# Patient Record
Sex: MALE | Race: WHITE | Employment: OTHER | URBAN - METROPOLITAN AREA
[De-identification: names, ages, dates, MRNs, and addresses within clinical notes are randomized per-mention and may not be internally consistent; named-entity substitution may affect disease eponyms.]

---

## 2017-01-26 ENCOUNTER — TELEPHONE (OUTPATIENT)
Dept: FAMILY MEDICINE CLINIC | Facility: CLINIC | Age: 72
End: 2017-01-26

## 2017-01-26 ENCOUNTER — PATIENT OUTREACH (OUTPATIENT)
Dept: FAMILY MEDICINE CLINIC | Facility: CLINIC | Age: 72
End: 2017-01-26

## 2017-01-26 NOTE — TELEPHONE ENCOUNTER
Spouse states she had to cancel pt's appt with Dr Shamika Salguero because he is still in rehab at The [facility] pt is stressed with discharge date, assitance with getting hospital bed on discharge, medications    Said pt would like to speak w

## 2017-01-26 NOTE — PROGRESS NOTES
Called [facility]) and left a VMM for Alison Morfin at # [phone]. I am calling to coordinate care efforts, and informed her that the patient's wife called here and is requesting services we do not typically provide.  CM name and number provided for cody

## 2017-02-02 NOTE — PROGRESS NOTES
Left PADMINI for Jojo at [facility], requesting a call back, as I did not receive a call back. I was inquiring if there were any additional care coordinations needs.

## 2017-02-02 NOTE — PROGRESS NOTES
Maxim Kay wife, emailed me just now, informing me of some discharge plans.  PLAN: Will try and contact [facility], to inquire about the possibility of a visiting MD.

## 2017-02-08 NOTE — PROGRESS NOTES
Email forwarded at this time. [ Silva Karthikeyan wife.  Had emailed me asking for information regarding certain rehab equipment.]

## 2017-02-08 NOTE — PROGRESS NOTES
Message left for [facility], requesting a person to discuss rehab equipment with. CM name and number provided for further follow-up.

## 2017-02-10 ENCOUNTER — TELEPHONE (OUTPATIENT)
Dept: FAMILY MEDICINE CLINIC | Facility: CLINIC | Age: 72
End: 2017-02-10

## 2017-02-10 NOTE — TELEPHONE ENCOUNTER
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 His wife, Eliza Islas, would like you to call her. Thank you.

## 2017-02-13 RX ORDER — LISINOPRIL 5 MG/1
5 TABLET ORAL DAILY
Qty: 30 TABLET | Refills: 0 | Status: CANCELLED | OUTPATIENT
Start: 2017-02-13

## 2017-02-13 RX ORDER — ACETAMINOPHEN 325 MG/1
650 TABLET ORAL EVERY 6 HOURS PRN
COMMUNITY
End: 2017-02-15

## 2017-02-13 RX ORDER — LEVETIRACETAM 750 MG/1
750 TABLET ORAL 2 TIMES DAILY
COMMUNITY
End: 2017-02-15

## 2017-02-13 RX ORDER — CLOPIDOGREL BISULFATE 75 MG/1
75 TABLET ORAL DAILY
COMMUNITY
End: 2017-02-15

## 2017-02-13 RX ORDER — ASPIRIN 81 MG/1
TABLET, CHEWABLE ORAL DAILY
COMMUNITY
End: 2017-02-15

## 2017-02-13 RX ORDER — LEVETIRACETAM 750 MG/1
750 TABLET ORAL 2 TIMES DAILY
Qty: 60 TABLET | Refills: 0 | Status: CANCELLED | OUTPATIENT
Start: 2017-02-13

## 2017-02-13 RX ORDER — ATORVASTATIN CALCIUM 40 MG/1
40 TABLET, FILM COATED ORAL NIGHTLY
COMMUNITY
End: 2017-02-15

## 2017-02-13 RX ORDER — ATORVASTATIN CALCIUM 40 MG/1
40 TABLET, FILM COATED ORAL NIGHTLY
Qty: 30 TABLET | Refills: 0 | Status: CANCELLED | OUTPATIENT
Start: 2017-02-13

## 2017-02-13 RX ORDER — CLOPIDOGREL BISULFATE 75 MG/1
75 TABLET ORAL DAILY
Qty: 30 TABLET | Refills: 0 | Status: CANCELLED | OUTPATIENT
Start: 2017-02-13

## 2017-02-13 RX ORDER — LISINOPRIL 5 MG/1
5 TABLET ORAL DAILY
COMMUNITY
End: 2017-02-15

## 2017-02-13 RX ORDER — FLUTICASONE PROPIONATE 50 MCG
1 SPRAY, SUSPENSION (ML) NASAL 2 TIMES DAILY
COMMUNITY
End: 2017-02-15

## 2017-02-13 RX ORDER — BIOTIN 1 MG
1 TABLET ORAL DAILY
COMMUNITY
End: 2017-02-15

## 2017-02-13 NOTE — TELEPHONE ENCOUNTER
Wife of patient. Maryjane Listen, emailed me and called me, to discuss Dr. Hernandez [place] renewing medications.

## 2017-02-13 NOTE — TELEPHONE ENCOUNTER
Per wife of pt, checking updates on pt rx med,  Pt is out of med for a day already and need it asap,  Wife of pt stts that the rehab facility should have given pt meds but they did not.  Per Wife of pt, pt supposed to take 3 of his med but since pt don't ha

## 2017-02-13 NOTE — TELEPHONE ENCOUNTER
TRIAGE-  Please see my telephone encounter from 2/10/17. I spoke to Dr. Mary Lorenzana, and he has agreed to refill medications. The patient is running out. This patient is S/P stroke, and was previously not on any medications.  The wife emailed me a copy of his

## 2017-02-13 NOTE — TELEPHONE ENCOUNTER
Please advise regarding new prescriptions. None of these are on pt medication list. Please sign if agree with message below from Domi OLIVARES.  Thank you      Hypertensive Medications  Protocol Criteria:  · Appointment scheduled in the past 6 months or in th

## 2017-02-20 ENCOUNTER — TELEPHONE (OUTPATIENT)
Dept: FAMILY MEDICINE CLINIC | Facility: CLINIC | Age: 72
End: 2017-02-20

## 2017-02-20 NOTE — TELEPHONE ENCOUNTER
Spoke with [place] at [employer], she is asking if Dr. Kirti Reis will be following pt for his [facility] orders for PT & OT. [place] will fax forms if doctor approves. Please advise.

## 2017-02-21 NOTE — TELEPHONE ENCOUNTER
Informed Ashely Means Dr. Tricia Hussein will be [facility]. Ashely Cruz will fax form for doctor.

## 2017-02-27 ENCOUNTER — TELEPHONE (OUTPATIENT)
Dept: FAMILY MEDICINE CLINIC | Facility: CLINIC | Age: 72
End: 2017-02-27

## 2017-02-27 NOTE — TELEPHONE ENCOUNTER
[address], physical therapist with [facility], called in stating she did a reassessment on pt today and is asking to extend pt's PT to twice a week, for the next four weeks taking effect March 5th.

## 2017-02-27 NOTE — TELEPHONE ENCOUNTER
Informed Amanda of [facility] [address] doctor approved extending PT, Luz Marina Sinclair had no further questions at this time.

## 2017-03-06 ENCOUNTER — TELEPHONE (OUTPATIENT)
Dept: FAMILY MEDICINE CLINIC | Facility: CLINIC | Age: 72
End: 2017-03-06

## 2017-03-06 NOTE — TELEPHONE ENCOUNTER
Forms received from [address] [place] [address]. Plan of Care signed and faxed back. No further action needed.

## 2017-04-05 ENCOUNTER — TELEPHONE (OUTPATIENT)
Dept: FAMILY MEDICINE CLINIC | Facility: CLINIC | Age: 72
End: 2017-04-05

## 2017-04-05 NOTE — TELEPHONE ENCOUNTER
Called back to pt's wife's call. She said they would like an appt sooner than the one given to them of 4/18/17 if possible. I reviewed Dr. Tory Soliz appts and didn't see anything.  I asked her what was occuring and she said that they finally got a stair li

## 2017-04-06 NOTE — TELEPHONE ENCOUNTER
Dr. Margit Goodpasture, When I went in to book the appt you approved for the sick/same day spot on Saturday, someone had already taken the spot for another pt. There is only your lunch hr listed still as green. Can you advise me as to where to put the pt?  The wife

## 2017-04-06 NOTE — TELEPHONE ENCOUNTER
Dr. Divya Roger ok'd being seen on Saturday April 8th at 12:00pm. Informed the pt's wife. She stated understanding and thanked us for our assistance.

## 2017-04-08 ENCOUNTER — OFFICE VISIT (OUTPATIENT)
Dept: FAMILY MEDICINE CLINIC | Facility: CLINIC | Age: 72
End: 2017-04-08

## 2017-04-08 ENCOUNTER — HOSPITAL ENCOUNTER (OUTPATIENT)
Dept: GENERAL RADIOLOGY | Age: 72
Discharge: HOME | End: 2017-04-08
Attending: FAMILY MEDICINE | Admitting: FAMILY MEDICINE
Payer: MEDICARE

## 2017-04-08 ENCOUNTER — TELEPHONE (OUTPATIENT)
Dept: FAMILY MEDICINE CLINIC | Facility: CLINIC | Age: 72
End: 2017-04-08

## 2017-04-08 VITALS
HEART RATE: 81 BPM | TEMPERATURE: 98 F | DIASTOLIC BLOOD PRESSURE: 70 MMHG | RESPIRATION RATE: 16 BRPM | BODY MASS INDEX: 25.58 KG/M2 | HEIGHT: 67.99 IN | WEIGHT: 168.81 LBS | SYSTOLIC BLOOD PRESSURE: 105 MMHG

## 2017-04-08 DIAGNOSIS — G89.29 CHRONIC PAIN OF LEFT WRIST: Primary | ICD-10-CM

## 2017-04-08 DIAGNOSIS — G89.29 CHRONIC PAIN OF LEFT WRIST: ICD-10-CM

## 2017-04-08 DIAGNOSIS — I63.111: ICD-10-CM

## 2017-04-08 DIAGNOSIS — M25.532 CHRONIC PAIN OF LEFT WRIST: Primary | ICD-10-CM

## 2017-04-08 DIAGNOSIS — M25.532 CHRONIC PAIN OF LEFT WRIST: ICD-10-CM

## 2017-04-08 DIAGNOSIS — I10 ESSENTIAL HYPERTENSION: ICD-10-CM

## 2017-04-08 PROCEDURE — G0463 HOSPITAL OUTPT CLINIC VISIT: HCPCS | Performed by: FAMILY MEDICINE

## 2017-04-08 PROCEDURE — 99214 OFFICE O/P EST MOD 30 MIN: CPT | Performed by: FAMILY MEDICINE

## 2017-04-08 PROCEDURE — 73110 X-RAY EXAM OF WRIST: CPT

## 2017-04-08 NOTE — TELEPHONE ENCOUNTER
Dr. Seamus Tobias saw Issac Anne on Saturday 4/8/17. Abhijitfaith Parekh left a Parking Placard form to be completed, she forgot to give it to Dr. Seamus Tobias during the appt. When the form is complete, pls mail to the , the form is in Dr. Janny Orosco.

## 2017-04-12 RX ORDER — LORATADINE 10 MG/1
10 TABLET ORAL NIGHTLY
Qty: 30 TABLET | Refills: 1 | Status: SHIPPED | OUTPATIENT
Start: 2017-04-12 | End: 2017-05-16

## 2017-04-12 NOTE — TELEPHONE ENCOUNTER
Wife and pt aware of x ray results and Dr. Earlene Evans note on them. They will call to Dr. Tammy Munoz to schedule appt.

## 2017-04-12 NOTE — TELEPHONE ENCOUNTER
Returned wife's outreach call to me. She said she had a couple of questions and actually were wondering if they could see the MD again as well. (1) She said she hadn't heard about his hand x ray and pt is still having a fair amt of pain.  I read her Dr. Karna Riedel emptying and sometimes he can't urinate unless this bladder is full and then it signals the brain. I read that often times there is only medication or surgery for this.  We wanted to bring this all up at the appt because Jd Holland is having loss of sleep, but Jd Holland

## 2017-04-13 ENCOUNTER — TELEPHONE (OUTPATIENT)
Dept: FAMILY MEDICINE CLINIC | Facility: CLINIC | Age: 72
End: 2017-04-13

## 2017-04-13 NOTE — TELEPHONE ENCOUNTER
----- Message from Franci Syed DO sent at 4/11/2017 10:14 PM CDT -----  Normal left wrist; no fracture. Recommend ortho/hand specialist. Referral on chart. Call patient.

## 2017-04-13 NOTE — TELEPHONE ENCOUNTER
Called wife. Let her know script was sent in for Claritin to their pharmacy by Dr. Ramila Trinidad and also he ok'd their need for a f/u appt on Monday April 17th at 6:40pm. Appt booked. She stated understanding and gratitude for the assistance.

## 2017-04-17 ENCOUNTER — OFFICE VISIT (OUTPATIENT)
Dept: FAMILY MEDICINE CLINIC | Facility: CLINIC | Age: 72
End: 2017-04-17

## 2017-04-17 ENCOUNTER — TELEPHONE (OUTPATIENT)
Dept: FAMILY MEDICINE CLINIC | Facility: CLINIC | Age: 72
End: 2017-04-17

## 2017-04-17 VITALS
RESPIRATION RATE: 18 BRPM | TEMPERATURE: 98 F | DIASTOLIC BLOOD PRESSURE: 74 MMHG | HEIGHT: 67.99 IN | SYSTOLIC BLOOD PRESSURE: 113 MMHG | HEART RATE: 80 BPM

## 2017-04-17 DIAGNOSIS — S40.022A: ICD-10-CM

## 2017-04-17 DIAGNOSIS — R20.9 ALTERATION OF SENSATIONS, POST-STROKE: Primary | ICD-10-CM

## 2017-04-17 DIAGNOSIS — R39.89 URINARY PROBLEM: ICD-10-CM

## 2017-04-17 DIAGNOSIS — I69.398 ALTERATION OF SENSATIONS, POST-STROKE: Primary | ICD-10-CM

## 2017-04-17 DIAGNOSIS — S40.012A: ICD-10-CM

## 2017-04-17 DIAGNOSIS — N40.1 BPH WITH OBSTRUCTION/LOWER URINARY TRACT SYMPTOMS: ICD-10-CM

## 2017-04-17 DIAGNOSIS — N13.8 BPH WITH OBSTRUCTION/LOWER URINARY TRACT SYMPTOMS: ICD-10-CM

## 2017-04-17 PROBLEM — N40.0 BPH WITHOUT OBSTRUCTION/LOWER URINARY TRACT SYMPTOMS: Status: ACTIVE | Noted: 2017-04-17

## 2017-04-17 PROCEDURE — 99214 OFFICE O/P EST MOD 30 MIN: CPT | Performed by: FAMILY MEDICINE

## 2017-04-17 PROCEDURE — G0463 HOSPITAL OUTPT CLINIC VISIT: HCPCS | Performed by: FAMILY MEDICINE

## 2017-04-17 RX ORDER — TAMSULOSIN HYDROCHLORIDE 0.4 MG/1
0.4 CAPSULE ORAL DAILY
Qty: 30 CAPSULE | Refills: 2 | Status: SHIPPED | OUTPATIENT
Start: 2017-04-17 | End: 2017-08-09

## 2017-04-17 NOTE — TELEPHONE ENCOUNTER
Hill Vazquez from [place] [address] states she faxed over plan of care for pt to [phone] last week, and has not heard back  Hill Vazquez is asking for a call back. Please advise.

## 2017-04-17 NOTE — TELEPHONE ENCOUNTER
Radha Gold returned call, states paperwork was faxed to office on 4/14/17, asking if doctor received it. Please advise.

## 2017-04-18 NOTE — PATIENT INSTRUCTIONS
Start flomax 0.4 mg orally daily. Consider switch to xyzal but for now continue with loratadine. Call [facility]). Consider neurology and urology appointments. May need a trial of gabapentin or lyrica. Observation left upper extremity.  Consider xray

## 2017-04-18 NOTE — PROGRESS NOTES
HPI:    Patient ID: Bassem Kahn is a 73year old male. Urinary  This is a chronic problem. The current episode started more than 1 month ago. The problem occurs constantly. The problem has been unchanged.  Associated symptoms include urinary symptoms Cholecalciferol (VITAMIN D3) 1000 units Oral Cap Take 1 tablet by mouth daily. Disp: 30 capsule Rfl: 5   Clopidogrel Bisulfate 75 MG Oral Tab Take 1 tablet (75 mg total) by mouth daily.  Disp: 30 tablet Rfl: 2   Fluticasone Propionate 50 MCG/ACT Nasal Suspe Start flomax 0.4 mg orally daily. Consider switch to xyzal but for now continue with loratadine. Call [facility]). Consider neurology and urology appointments. May need a trial of gabapentin or lyrica. Observation left upper extremity.  Consider xray

## 2017-04-18 NOTE — TELEPHONE ENCOUNTER
Reason for call changed to \"forms completion. \"  Please check if this paperwork was received and f/u with its completion.

## 2017-04-21 PROBLEM — I63.9 STROKE (CEREBRUM) (HCC): Status: ACTIVE | Noted: 2017-04-21

## 2017-04-22 NOTE — PATIENT INSTRUCTIONS
Compliance with medication. Continue with taught therapy at home. Pain management via prescription medications as needed.

## 2017-04-22 NOTE — PROGRESS NOTES
HPI:    Patient ID: Naveed Meeks is a 73year old male. Pain  This is a chronic problem. The current episode started more than 1 month ago. The problem occurs constantly. The problem has been unchanged.  Pertinent negatives include no chest pain or h Suspension 1 spray by Each Nare route 2 (two) times daily. Disp: 1 Inhaler Rfl: 2   levetiracetam 750 MG Oral Tab Take 1 tablet (750 mg total) by mouth 2 (two) times daily.  Disp: 60 tablet Rfl: 2   lisinopril 5 MG Oral Tab Take 1 tablet (5 mg total) by romel management via prescription medications as needed. Return in about 1 month (around 5/8/2017), or if symptoms worsen or fail to improve.          #[id number]

## 2017-04-27 ENCOUNTER — TELEPHONE (OUTPATIENT)
Dept: FAMILY MEDICINE CLINIC | Facility: CLINIC | Age: 72
End: 2017-04-27

## 2017-04-27 DIAGNOSIS — I69.354 HEMIPARESIS AFFECTING LEFT SIDE AS LATE EFFECT OF STROKE (HCC): ICD-10-CM

## 2017-04-27 DIAGNOSIS — I63.111 CEREBROVASCULAR ACCIDENT (CVA) DUE TO EMBOLISM OF RIGHT VERTEBRAL ARTERY (HCC): ICD-10-CM

## 2017-04-27 DIAGNOSIS — N40.1 BENIGN PROSTATIC HYPERPLASIA WITH LOWER URINARY TRACT SYMPTOMS, UNSPECIFIED MORPHOLOGY: Primary | ICD-10-CM

## 2017-04-27 NOTE — TELEPHONE ENCOUNTER
Spouse states pt had some concerns. Dr. Abena Meraz was going to find out about getting pt enrolled in the day program through [facility] or if doctor wants spouse to look into this.  Spouse states pt is deteriorating psychologically as he is not able to do things he

## 2017-04-28 NOTE — TELEPHONE ENCOUNTER
Neurology of ortho referral would be good for confirming a diagnosis related to his left wrist. Even if she chooses to get alternative treatment it would be good to have the diagnosis confirmed first. I have not reached out to [facility] yet but hopefully will to

## 2017-04-28 NOTE — TELEPHONE ENCOUNTER
Reviewed doctor's recommendations with pt's spouse. She will have pt keep appt with Dr. Kerrie Levine as scheduled.  Spouse states pt will not agree with seeing a psychiatrist due to his past experience with meds prescribed by a psychiatrist. Spouse would like u

## 2017-05-01 NOTE — TELEPHONE ENCOUNTER
Pt's spouse states [facility] has not received a referral for day program for pt. Nikita Sun has talked with the manager of the day program, her name is Angie Oh and she can be contacted at [phone].  Spouse states the referral and all doctor's notes regarding pt's

## 2017-05-02 ENCOUNTER — OFFICE VISIT (OUTPATIENT)
Dept: ORTHOPEDICS CLINIC | Facility: CLINIC | Age: 72
End: 2017-05-02

## 2017-05-02 DIAGNOSIS — M25.612 STIFF SHOULDER, LEFT: Primary | ICD-10-CM

## 2017-05-02 DIAGNOSIS — M25.632 STIFF WRIST, LEFT: ICD-10-CM

## 2017-05-02 DIAGNOSIS — M25.642 STIFFNESS OF HAND JOINT, LEFT: ICD-10-CM

## 2017-05-02 DIAGNOSIS — I63.9 CEREBROVASCULAR ACCIDENT (CVA), UNSPECIFIED MECHANISM (HCC): ICD-10-CM

## 2017-05-02 PROBLEM — M25.639 STIFF WRIST: Status: ACTIVE | Noted: 2017-05-02

## 2017-05-02 PROBLEM — M25.649 STIFFNESS OF HAND JOINT: Status: ACTIVE | Noted: 2017-05-02

## 2017-05-02 PROBLEM — M25.619: Status: ACTIVE | Noted: 2017-05-02

## 2017-05-02 PROCEDURE — 99214 OFFICE O/P EST MOD 30 MIN: CPT | Performed by: ORTHOPAEDIC SURGERY

## 2017-05-02 PROCEDURE — G0463 HOSPITAL OUTPT CLINIC VISIT: HCPCS | Performed by: ORTHOPAEDIC SURGERY

## 2017-05-02 RX ORDER — GABAPENTIN 100 MG/1
100 CAPSULE ORAL NIGHTLY
Qty: 90 CAPSULE | Refills: 0 | Status: SHIPPED | OUTPATIENT
Start: 2017-05-02 | End: 2017-07-21

## 2017-05-02 NOTE — TELEPHONE ENCOUNTER
Generate referral and I will sign off and we can send the notes that I have. This branch would do well to contact [facility] where the patient has been receiving his care since the stroke occurred.

## 2017-05-03 ENCOUNTER — TELEPHONE (OUTPATIENT)
Dept: FAMILY MEDICINE CLINIC | Facility: CLINIC | Age: 72
End: 2017-05-03

## 2017-05-04 NOTE — PROGRESS NOTES
5/2/2017  Radha Band  [birth year]  73year old   male  Job MD [place]    HPI:   Patient presents with:  Consult: Pt had a stoke on November 20th. with left side effected. Had rehab at [place] [address] and then [facility]   Now at home with home Tab Take 1 tablet (750 mg total) by mouth 2 (two) times daily. Disp: 60 tablet Rfl: 2   lisinopril 5 MG Oral Tab Take 1 tablet (5 mg total) by mouth daily.  Disp: 30 tablet Rfl: 2      HISTORY:  Past Medical History   Diagnosis Date   • History of depressio rotation, and internal rotation to T8 on the opposite side. He has full elbow motion.   The patient's wrist range of motion if from 20 degrees of dorsiflexion to 20 volar flexion with supination of 80 degrees and pronation of 90 degrees compared to  60 deg

## 2017-05-08 ENCOUNTER — TELEPHONE (OUTPATIENT)
Dept: FAMILY MEDICINE CLINIC | Facility: CLINIC | Age: 72
End: 2017-05-08

## 2017-05-08 DIAGNOSIS — I63.111 CEREBROVASCULAR ACCIDENT (CVA) DUE TO EMBOLISM OF RIGHT VERTEBRAL ARTERY (HCC): Primary | ICD-10-CM

## 2017-05-08 DIAGNOSIS — I69.354 HEMIPARESIS AFFECTING LEFT SIDE AS LATE EFFECT OF CEREBROVASCULAR ACCIDENT (HCC): ICD-10-CM

## 2017-05-08 NOTE — TELEPHONE ENCOUNTER
Wife stts their is an order that need to be rewritten for Outpatient  Spouse stts the Medical Order necessity, the type of service should be listed as PT and OT   Spouse stts it has to say The day Rehab program (which are 3 hour sessions)   Spouse stts thi

## 2017-05-09 ENCOUNTER — TELEPHONE (OUTPATIENT)
Dept: FAMILY MEDICINE CLINIC | Facility: CLINIC | Age: 72
End: 2017-05-09

## 2017-05-09 DIAGNOSIS — M79.642 LEFT HAND PAIN: Primary | ICD-10-CM

## 2017-05-09 NOTE — TELEPHONE ENCOUNTER
Spouse calling regarding needing a referral for an orthopedic that is close to pt home.   Please advise

## 2017-05-15 ENCOUNTER — TELEPHONE (OUTPATIENT)
Dept: FAMILY MEDICINE CLINIC | Facility: CLINIC | Age: 72
End: 2017-05-15

## 2017-05-15 NOTE — TELEPHONE ENCOUNTER
I'm not sure if his diagnoses qualify for this therapy, however I suggest the patient visit a marijuana clinic and they will provide him with a list to get to me as to what the state deems treatable via marijuana.

## 2017-05-15 NOTE — TELEPHONE ENCOUNTER
Pt's wife asking to speak with a nurse. Spouse stts pt's anxiety and stress is getting worse. please advise.

## 2017-05-15 NOTE — TELEPHONE ENCOUNTER
Call transferred to RN from [address] [place] [address]. Pt's spouse is asking  whether or not Dr. Chicho Craven would be open to discussing prescribing medical cannabis for pt's extreme anxiety and stress related to stroke.  Pt does not want to take meds for anxiety as he had prob

## 2017-05-15 NOTE — TELEPHONE ENCOUNTER
Pt's spouse returned call. States she went to the "[employer] and obtained list of covered dx from that website. Spouse states PTSD is a covered diagnosis. Spouse asked to schedule appt to discuss this with Dr. Sheldon Gil. Please advise.

## 2017-05-16 ENCOUNTER — TELEPHONE (OUTPATIENT)
Dept: FAMILY MEDICINE CLINIC | Facility: CLINIC | Age: 72
End: 2017-05-16

## 2017-05-16 RX ORDER — LORATADINE 10 MG/1
10 TABLET ORAL NIGHTLY
Qty: 30 TABLET | Refills: 2 | Status: SHIPPED | OUTPATIENT
Start: 2017-05-16 | End: 2017-07-21

## 2017-05-16 RX ORDER — CLOPIDOGREL BISULFATE 75 MG/1
75 TABLET ORAL DAILY
Qty: 30 TABLET | Refills: 2 | Status: SHIPPED | OUTPATIENT
Start: 2017-05-16 | End: 2017-07-21

## 2017-05-16 NOTE — TELEPHONE ENCOUNTER
Spouse states she received a call from pharmacist informing her some prescriptions were ready for . She is asking if pt's Loratadine and Clopidogrel can also be refilled, she would like to  all meds at one time. Meds pended for review.

## 2017-05-16 NOTE — TELEPHONE ENCOUNTER
She has question on which medication will he be taking. Because he has no refills on some of them  Then the next question was that she want a referral to the neuro doctor that is in [facility], or [place], [place], [address] [place].   SHe does not want to come

## 2017-05-17 NOTE — TELEPHONE ENCOUNTER
Pt's wife is calling again stating that the order needs to be rewritten. She would like to speak with nurse Yahir Rider please.  Thank you

## 2017-05-17 NOTE — TELEPHONE ENCOUNTER
Patient wife calling states:  the the order has to say occupational Therapy - px therapy, and speech therapy if doctor can.    Also has to be signed by doctor and be an order not referral.

## 2017-05-18 NOTE — TELEPHONE ENCOUNTER
Pt has Medicare and [employer] secondary. She does not need referrals. FR would you know of an ortho in [facility]?

## 2017-05-18 NOTE — TELEPHONE ENCOUNTER
You can send my office notes and you can write the requested referrals and I will sign off. Again, just in case no one saw what I wrote the first time, this patient was treated @ [facility]...  If they really want the complete records and scenario t

## 2017-05-19 NOTE — TELEPHONE ENCOUNTER
Current Outpatient Prescriptions:  levetiracetam 750 MG Oral Tab Take 1 tablet (750 mg total) by mouth 2 (two) times daily.  Disp: 60 tablet Rfl: ROME LEI REFILL

## 2017-05-19 NOTE — TELEPHONE ENCOUNTER
Requesting Levetiracetam refill    Last filled 2/15/17  Last OV 4/17/17; appt scheduled 6/13/17    Med pended for review, please advise

## 2017-05-20 RX ORDER — LEVETIRACETAM 750 MG/1
750 TABLET ORAL 2 TIMES DAILY
Qty: 60 TABLET | Refills: 2 | Status: SHIPPED | OUTPATIENT
Start: 2017-05-20 | End: 2017-07-21

## 2017-05-26 NOTE — TELEPHONE ENCOUNTER
Call transferred to RN from [address] [place] [address]. Pt's spouse state she needs an order,not a referral, for the [address] [place] [address] Avenue in [facility].  Spouse states this needs to, have at least two therapies listed preferably three-physi

## 2017-06-07 ENCOUNTER — TELEPHONE (OUTPATIENT)
Dept: FAMILY MEDICINE CLINIC | Facility: CLINIC | Age: 72
End: 2017-06-07

## 2017-06-07 NOTE — TELEPHONE ENCOUNTER
Wife, Will Brink called in for patient. She said she would like to speak to [facility] or Dr Dana Mckenzie re: patient medication's. She is taking him off two of the medications. That is all she would tell me.

## 2017-06-07 NOTE — TELEPHONE ENCOUNTER
Spouse states after today she is not going to give pt Levetiracetam and Atorvastatin as pt thinks he is having side effects from these meds, just not feeling well, can't sleep and having aches and pains.  Pt had a stroke on 11/20/16 and was placed on these

## 2017-06-07 NOTE — TELEPHONE ENCOUNTER
I don't agree with the plan to stop cholesterol lowering medication, especially after the recent history of stroke. I do think he should make an appointment to discuss this with [facility].   Thanks

## 2017-06-07 NOTE — TELEPHONE ENCOUNTER
Pt informed of Dr Prudence Tatum orders below. Offered appt with Dr Mary Ruelas to discuss his issue further, but will have his caretaker call back to schedule his appt.

## 2017-06-08 NOTE — TELEPHONE ENCOUNTER
Pt's spouse called back, spouse will continue to give pt his Atorvastatin as prescribed. Pt has an appt with Dr. Howell at [facility] on 6/19/17 and will have his medications reviewed at that time as Dr. Howell prescribed meds when pt was at [facility].  Spouse also stat

## 2017-07-05 ENCOUNTER — TELEPHONE (OUTPATIENT)
Dept: FAMILY MEDICINE CLINIC | Facility: CLINIC | Age: 72
End: 2017-07-05

## 2017-07-05 DIAGNOSIS — K59.00 DIFFICULT BOWEL MOVEMENTS: Primary | ICD-10-CM

## 2017-07-05 NOTE — TELEPHONE ENCOUNTER
LMTCB to relay MD message below and provide GI contact information. Please transfer [id number] anytime. Thank you.       Referred to  [address] [place] [address], [address], [place] [address] [address] [phone]

## 2017-07-05 NOTE — TELEPHONE ENCOUNTER
Vasquez Jordan calling on behalf of patient. States patient is experiencing constipation.  Please call patient at [phone]

## 2017-07-05 NOTE — TELEPHONE ENCOUNTER
Actions Requested: pt states he feels something stuck in his rectum, pt had a bowel movement this morning. Pt and spouse state he always has a problem with constipation but can't take any  meds for this.  Pt declined advice to go to UC as no appt available

## 2017-07-06 NOTE — TELEPHONE ENCOUNTER
Spouse returned call, reviewed doctor's GI referral recommendations. Spouse declined to see GI and she is using alternative methods from a [facility] specialist to relieve pt's abd discomfort, she will be taking pt to see a holistic practitioner, Johan Wright.  For

## 2017-07-14 ENCOUNTER — TELEPHONE (OUTPATIENT)
Dept: SURGERY | Facility: CLINIC | Age: 72
End: 2017-07-14

## 2017-07-14 NOTE — TELEPHONE ENCOUNTER
Verbally informed  of scenario as outlined below in this encounter, and  stated he agrees with plan for ER. No further orders/advice given. Phoned wife back and received voice mail.  Lmtcb, stating Henryace Shabana agrees with advice for ER,as advis

## 2017-07-14 NOTE — TELEPHONE ENCOUNTER
See below. Returned wife's call and spoke with her. She states pt can only urinate when laying down, and this issue with not being able to urinate is is driving him insane. Ask wife how long pt has not been able to urinate.  She states this has been for [employer]

## 2017-07-14 NOTE — TELEPHONE ENCOUNTER
pts wife, Marcelina Wells called. Bradley Mullins had a stroke in the past, trouble with urination, can only urinated while laying down. Has been on Tamsulosin for a month, not helping. I made appt with [facility] on 8/7/17 because that was the soonest with all out 's.  (They

## 2017-07-21 ENCOUNTER — OFFICE VISIT (OUTPATIENT)
Dept: SURGERY | Facility: CLINIC | Age: 72
End: 2017-07-21

## 2017-07-21 ENCOUNTER — APPOINTMENT (OUTPATIENT)
Dept: LAB | Facility: HOSPITAL | Age: 72
End: 2017-07-21
Attending: UROLOGY
Payer: MEDICARE

## 2017-07-21 VITALS — SYSTOLIC BLOOD PRESSURE: 113 MMHG | DIASTOLIC BLOOD PRESSURE: 76 MMHG | HEART RATE: 79 BPM | TEMPERATURE: 98 F

## 2017-07-21 DIAGNOSIS — N19 RENAL FAILURE, UNSPECIFIED CHRONICITY: ICD-10-CM

## 2017-07-21 DIAGNOSIS — R35.0 BENIGN PROSTATIC HYPERPLASIA WITH URINARY FREQUENCY: ICD-10-CM

## 2017-07-21 DIAGNOSIS — R31.29 MICROHEMATURIA: ICD-10-CM

## 2017-07-21 DIAGNOSIS — R35.1 NOCTURIA: ICD-10-CM

## 2017-07-21 DIAGNOSIS — N31.9 NEUROGENIC BLADDER: ICD-10-CM

## 2017-07-21 DIAGNOSIS — K59.01 SLOW TRANSIT CONSTIPATION: ICD-10-CM

## 2017-07-21 DIAGNOSIS — N20.0 KIDNEY STONE: Primary | ICD-10-CM

## 2017-07-21 DIAGNOSIS — N40.1 BENIGN PROSTATIC HYPERPLASIA WITH URINARY FREQUENCY: ICD-10-CM

## 2017-07-21 LAB
ALBUMIN SERPL BCP-MCNC: 4.4 G/DL (ref 3.5–4.8)
ANION GAP SERPL CALC-SCNC: 8 MMOL/L (ref 0–18)
BACTERIA UR QL AUTO: NEGATIVE /HPF
BILIRUB UR QL: NEGATIVE
BUN SERPL-MCNC: 15 MG/DL (ref 8–20)
BUN/CREAT SERPL: 18.5 (ref 10–20)
CALCIUM SERPL-MCNC: 9.7 MG/DL (ref 8.5–10.5)
CHLORIDE SERPL-SCNC: 105 MMOL/L (ref 95–110)
CO2 SERPL-SCNC: 26 MMOL/L (ref 22–32)
COLOR UR: YELLOW
CREAT SERPL-MCNC: 0.81 MG/DL (ref 0.5–1.5)
GLUCOSE SERPL-MCNC: 94 MG/DL (ref 70–99)
GLUCOSE UR-MCNC: NEGATIVE MG/DL
HYALINE CASTS #/AREA URNS AUTO: 3 /LPF
KETONES UR-MCNC: NEGATIVE MG/DL
NITRITE UR QL STRIP.AUTO: NEGATIVE
OSMOLALITY UR CALC.SUM OF ELEC: 289 MOSM/KG (ref 275–295)
PH UR: 5 [PH] (ref 5–8)
PHOSPHATE SERPL-MCNC: 3.4 MG/DL (ref 2.4–4.7)
POTASSIUM SERPL-SCNC: 4.1 MMOL/L (ref 3.3–5.1)
PROT UR-MCNC: 30 MG/DL
RBC #/AREA URNS AUTO: 5 /HPF
SODIUM SERPL-SCNC: 139 MMOL/L (ref 136–144)
SP GR UR STRIP: 1.03 (ref 1–1.03)
UROBILINOGEN UR STRIP-ACNC: <2
VIT C UR-MCNC: NEGATIVE MG/DL
WBC #/AREA URNS AUTO: 2 /HPF

## 2017-07-21 PROCEDURE — 87086 URINE CULTURE/COLONY COUNT: CPT

## 2017-07-21 PROCEDURE — 99215 OFFICE O/P EST HI 40 MIN: CPT | Performed by: UROLOGY

## 2017-07-21 PROCEDURE — 36415 COLL VENOUS BLD VENIPUNCTURE: CPT

## 2017-07-21 PROCEDURE — G0463 HOSPITAL OUTPT CLINIC VISIT: HCPCS | Performed by: UROLOGY

## 2017-07-21 PROCEDURE — 80069 RENAL FUNCTION PANEL: CPT

## 2017-07-21 PROCEDURE — 87077 CULTURE AEROBIC IDENTIFY: CPT

## 2017-07-21 PROCEDURE — 81001 URINALYSIS AUTO W/SCOPE: CPT

## 2017-07-21 RX ORDER — FINASTERIDE 5 MG/1
5 TABLET, FILM COATED ORAL DAILY
Qty: 90 TABLET | Refills: 3 | Status: SHIPPED | OUTPATIENT
Start: 2017-07-21 | End: 2018-07-21

## 2017-07-21 RX ORDER — TAMSULOSIN HYDROCHLORIDE 0.4 MG/1
CAPSULE ORAL DAILY
COMMUNITY

## 2017-07-21 NOTE — PATIENT INSTRUCTIONS
1.  At the laboratory today blood draw for renal function (exclude possibility of kidney failure); also urinalysis urine test and urine culture    2.   For long-standing constipation, please take a tablespoon of milk of magnesia every day; if 1 tablespoon i Hyperplasia    The prostate is a small gland that makes semen. As you age, the prostate grows. If it becomes too big, it may cause problems with urination. This condition is called benign prostatic hyperplasia (BPH).   Symptoms of BPH  BPH is common in men muscle to stop or slow down the flow of urine. Hold for 10 seconds. Repeat at least 5 times. Do the exercise 3 to 5 times each day. Certain medicines can worsen BPH symptoms. These include medicines for congestion, allergies, and depression.  Medicines monica

## 2017-07-21 NOTE — PROGRESS NOTES
HPI:    Patient ID: Newton Lorenzo is a 73year old male. HPI    History provided by patient and wife, Trina Fields. 1. Kidney stone   Hx of kidney stones. No concerning sxs at present. No dysuria or gross hematuria.  Pt states he has considerably reduc [address] [facility]  consult Dr. Pino Aw 4/14/2016 for 2 distal left ureteral calculi 5 mm and 2.5 mm causing left hydroureteronephrosis and renal colic; CT showed additional 3 mm stone left kidney. Moderate to large BPH 2-3+ enlarged.  Failed to pass stone.  4/15/2016 cysto \"cataracts\" per NG   • CVA (cerebral vascular accident) [facility])     Nov 2016   • History of depression    • IBS (irritable bowel syndrome)     irritable bowel disease      Past Surgical History:  No date: APPENDECTOMY  2011: CATARACT Bilateral      Commen 1. The previously seen calculus along the distal aspect of the left ureteral stent is not clearly delineated by radiographic technique.   2. Numerous deep pelvic calcifications are favored to represent phleboliths and do not appear significantly changed in Plan: Patient reports worsening in voiding dysfunction after stroke 11/2016. He describes change as confusing urinating signals such as urgency and then lack of sensation to urinating. He reports he is able to urinate easier while laying on side or back.  Raghu Phililps 3.  Please schedule bladder ultrasound--exclude possibility of urinary retention/incomplete emptying of the bladder; please call office day after the bladder ultrasound leave message you are calling for the results and we will get back to    4.  To lower yo BPH is common in men over age 61. That’s because the prostate grows bigger during a man’s life. As it grows, it presses against the urethra. The urethra carries urine out of your body from your bladder through your penis.  Your bladder may also weaken as yo Certain medicines can worsen BPH symptoms. These include medicines for congestion, allergies, and depression. Medicines that increase your urine flow (diuretics) can also worsen BPH symptoms. If you take any of these, talk with your provider.  You may need By signing my name below, Sammy Kerr,  attest that this documentation has been prepared under the direction and in the presence of Elias Vallejo MD.   Electronically Signed: Cheryle Boning, 7/21/2017, 1:12 PM.      Abe Chowdhury, Elias Vallejo MD,  person

## 2017-07-24 ENCOUNTER — HOSPITAL ENCOUNTER (OUTPATIENT)
Dept: ULTRASOUND IMAGING | Age: 72
Discharge: HOME | End: 2017-07-24
Attending: UROLOGY
Payer: MEDICARE

## 2017-07-24 DIAGNOSIS — R35.1 NOCTURIA: ICD-10-CM

## 2017-07-24 DIAGNOSIS — N31.9 NEUROGENIC BLADDER: ICD-10-CM

## 2017-07-24 PROCEDURE — 76857 US EXAM PELVIC LIMITED: CPT | Performed by: UROLOGY

## 2017-07-25 ENCOUNTER — TELEPHONE (OUTPATIENT)
Dept: SURGERY | Facility: CLINIC | Age: 72
End: 2017-07-25

## 2017-07-25 NOTE — TELEPHONE ENCOUNTER
----- Message from Saad Garber MD sent at 7/23/2017  1:09 PM CDT -----  Please call patient.   On 7/21/17 urine culture showed a bacteria which is usually present on the skin of the outside of the body and usually does not cause urinary tract infectio

## 2017-07-26 ENCOUNTER — PATIENT MESSAGE (OUTPATIENT)
Dept: SURGERY | Facility: CLINIC | Age: 72
End: 2017-07-26

## 2017-07-26 ENCOUNTER — TELEPHONE (OUTPATIENT)
Dept: SURGERY | Facility: CLINIC | Age: 72
End: 2017-07-26

## 2017-07-26 NOTE — TELEPHONE ENCOUNTER
----- Message from Vasiliy Roberson MD sent at 7/24/2017  8:57 PM CDT -----  Please call patient and/or nursing home. Bladder ultrasound shows post void residual of 188 mL or approximately 6 ounces; patient has incomplete emptying of the bladder but does not need insertion of Pichardo catheter. Anytime patient urinates, he should wait 1-2 minutes after urination and then tried to push additional urine out of the bladder. Important to avoid constipation, which would make bladder emptying problem worse. Please continue urology plans as last discussed.   I wish patient the best of health, HARRY WrenD

## 2017-07-26 NOTE — TELEPHONE ENCOUNTER
I spoke with pt and gave results and instructions and pt and spouse were on the line and informed that pt did not void after the bladder US because he could not and tried to void for over 30 min but could not so the so called PVR is actually what was retained after drinking fluids to prepare for the [address]. Pt also admitted that he struggles with constipation and is current using MOM and also considering a less harsh laxative also. I told pt that I will inform PVK. Tasked to [address]  for his info.

## 2017-07-31 NOTE — TELEPHONE ENCOUNTER
I agree with these plans, and my recommendations are still the same as I entered on 7/24/17.   Thanks, Dr. Edgardo Aquino

## 2017-08-14 RX ORDER — TAMSULOSIN HYDROCHLORIDE 0.4 MG/1
0.4 CAPSULE ORAL DAILY
Qty: 30 CAPSULE | Refills: 2 | Status: SHIPPED | OUTPATIENT
Start: 2017-08-14 | End: 2017-09-13

## 2017-08-15 NOTE — TELEPHONE ENCOUNTER
LOV: 4/17/17 Last Rx: listed as historical     Please advise in regards to refill request. Thank You

## 2017-08-21 ENCOUNTER — TELEPHONE (OUTPATIENT)
Dept: SURGERY | Facility: CLINIC | Age: 72
End: 2017-08-21

## 2017-08-21 ENCOUNTER — NURSE TRIAGE (OUTPATIENT)
Dept: OTHER | Age: 72
End: 2017-08-21

## 2017-08-21 DIAGNOSIS — R10.9 FLANK PAIN: ICD-10-CM

## 2017-08-21 DIAGNOSIS — N20.0 KIDNEY STONE: Primary | ICD-10-CM

## 2017-08-21 NOTE — TELEPHONE ENCOUNTER
Action Requested: Summary for Provider     []  Critical Lab, Recommendations Needed  [] Need Additional Advice  []   FYI    []   Need Orders  [] Need Medications Sent to Pharmacy  [x]  Other     SUMMARY:    Patient's wife is asking what the patient can edwina

## 2017-08-21 NOTE — TELEPHONE ENCOUNTER
Pt's spouse called about a new pain pt has been experiencing in his back/side area. While on phone with spouse she received a call from the urologist office, spouse states she will call back after talking with Dr. Kait Soliman' nurse.

## 2017-08-21 NOTE — TELEPHONE ENCOUNTER
Spoke with pt and spouse and determined that pt has severe pain in the Rt. Flank area when in a sitting position and when he attempts to stand and walk. The pain subsides when he is lying down or reclining. Denies that pain radiates to any other area of the body. States that he has not taken anything for pain and is asking that PVK prescribe something. Also wants to know if he can still go to his rehab tomorrow. States he has a HX of stones and thinks it may be a stone. Denies blood in his urine, fever and chills or n/v. Moving his bowels daily without issue. I informed that typically kidney stone pain does not subside or get better with position changes. I also suggested that this pain may worse at rehab and could also possibly be related to something that the rehab exercises cause. I told them that I will send this msg to Leonor Corona of his concerns and requests and get back to them with a response. Tasked to Elena Kathleen. Please advise.

## 2017-08-21 NOTE — TELEPHONE ENCOUNTER
Please call patient back. If symptoms since from patient's description, symptoms appear severe, I recommend CT without IV contrast; I will enter order. Further recommendations will depend on that. .  Thanks,  Dr. Delano Anne

## 2017-08-21 NOTE — TELEPHONE ENCOUNTER
Spoke with pt's spouse and informed her of PVK's response msg and instructions below and told her that the order is in the system and told her to ck with the secondary ins if PA is needed and if so let us know and if not to call and schd. I gave the central schdg. # to call. Spouse also informed that she spoke with Dr. Vaishali Hernandez office and they suggested use of a heating pad and she states that it has brought his pain level down to 4-5. I told her that I would let PVK know. Tasked to Elan Hector for his info.

## 2017-08-22 NOTE — TELEPHONE ENCOUNTER
Wife feels it Pt's Posture r/t to discomfort,once his posture is corrected after 30 mins,the 10/10 pain is decreased to 5/10,rubbed Tiger balm to sore doug, she  Plans to give him ibuprofen also, will make appt if all fails

## 2017-08-22 NOTE — TELEPHONE ENCOUNTER
Patient's wife was informed of Dr. Chicho Craven recommendation with understanding. Will report back in 1 week of    Deonna Talavera DO Physician Signed  Date of Service: 08/21/2017 1:12 PM         Renal status as of 07/21/17 was very good.  600 mg orally

## 2017-08-22 NOTE — TELEPHONE ENCOUNTER
Renal status as of 07/21/17 was very good. 600 mg orally twice daily with meals x 7 days and then give a report.

## 2017-08-22 NOTE — TELEPHONE ENCOUNTER
Pt's wife called back again. Would like to know max dose of ibuprofen FJR recommends along with his other medications and his renal history. FJR please advise.

## 2017-08-22 NOTE — TELEPHONE ENCOUNTER
Akilah Hilton. Natalia Rodríguez, just wanted to verify you had seen this? The time stamp on your last note was 1:12pm, but I think that may be wrong? Please advise.  Thanks

## 2017-08-22 NOTE — TELEPHONE ENCOUNTER
Dr Sheldon Gil: spouse is asking if you can recommend a pain medication or do you recommend she increase ibuprofen?     I received an Incoming call:  Noam Bradley from spouse: Patient still c/o of lower back pain (waist area)     Patient has tried Ibuprofen 200mg ev

## 2017-08-22 NOTE — TELEPHONE ENCOUNTER
Have any measures of pain control been tried? May need a same day assessment if source has not been determined.

## 2017-08-22 NOTE — TELEPHONE ENCOUNTER
I have no idea what you all are looking for here. It seems as though the wife says she will get back to us if she needs to. What specifically do you want advise on here?

## 2017-08-23 NOTE — TELEPHONE ENCOUNTER
Spoke with pt's spouse who called stating that she spoke with Dr. Alhaji Mack office instructed that he thinks that pt's rt flank pain is muscular in nature because he is sitting in the w/c for many hrs at a time and is using the muscles on the rt. Side much more to compensate for the stroke arm on the left.

## 2017-08-28 ENCOUNTER — TELEPHONE (OUTPATIENT)
Dept: FAMILY MEDICINE CLINIC | Facility: CLINIC | Age: 72
End: 2017-08-28

## 2017-08-28 NOTE — TELEPHONE ENCOUNTER
Called pt's wife, Rain Spencer, she is inquiring about the dosage of Ibuprofen. She states that she believes the ibuprofen 600mg is relieving his pain, she is asking what the dose should be on now that it has been a week. Still doing exercises for posture. Pain is still present but slightly improved. He is still going to therapy and the pt is motivated to keep up with exercise at home now due to his pain. She is disappointed, however, because Medicare is \"kicking him out of therapy because of his lack of progress. \" I advised that she discuss this with his physical therapist. If he is now improving, it's possible that the decision can be changed. Advised her that if his pain is relieved with a lesser dose, then that it is the dose that should be used. Stressed that 600mg was the upper limit that was advised by the Dr. but that a lower dose that will make him comfortable is always recommended. Also stressed importance of taking med with food. She voices understanding and agrees with plan, she will try 400mg tomorrow. DANIELA Licea, also, please have staff address form issue below. She also states that there is a hippa form that she needs sent from the office so that she can give it to [facility]. She is trying to get a home tax exemption and needs information to be released. Please advise, she was told by TOMMY that the office has the forms. She would like one mailed.

## 2017-08-28 NOTE — TELEPHONE ENCOUNTER
Pts wife has questions about dosing for giving the pt. Ibuprofen 600mg. She states that the dosage is supposed to be tapered this week?

## 2017-08-30 NOTE — TELEPHONE ENCOUNTER
Agree with advise given. As far as any administrative forms you all would do better to direct that to the administration of this office or the [facility] or to [facility].

## 2017-09-06 ENCOUNTER — TELEPHONE (OUTPATIENT)
Dept: FAMILY MEDICINE CLINIC | Facility: CLINIC | Age: 72
End: 2017-09-06

## 2017-09-06 NOTE — TELEPHONE ENCOUNTER
Spouse states that pt is going to be discharged from [facility]. Spouse would like to know if pt should continue to take melatonin medication and would like to confirm if the dosage pt is taking is correct.  Spouse would like to speak

## 2017-09-06 NOTE — TELEPHONE ENCOUNTER
Spouse would like to know the name of the ophthalmologist that did the cataract surgery of the patient.

## 2017-09-09 ENCOUNTER — NURSE TRIAGE (OUTPATIENT)
Dept: FAMILY MEDICINE CLINIC | Facility: CLINIC | Age: 72
End: 2017-09-09

## 2017-09-09 NOTE — TELEPHONE ENCOUNTER
Action Requested: Summary for Provider     []  Critical Lab, Recommendations Needed  [] Need Additional Advice  []   FYI    []   Need Orders  [] Need Medications Sent to Pharmacy  []  Other     SUMMARY: OFFICE VISIT, blood in stool  Pt's wife calling, stat

## 2017-09-19 ENCOUNTER — TELEPHONE (OUTPATIENT)
Dept: SURGERY | Facility: CLINIC | Age: 72
End: 2017-09-19

## 2017-09-19 NOTE — TELEPHONE ENCOUNTER
Pts spouse states she needs to speak to RN, states she does not the priority of the 4 tests ordered by PVK for pt. Pls call thank you.

## 2017-09-21 ENCOUNTER — OFFICE VISIT (OUTPATIENT)
Dept: FAMILY MEDICINE CLINIC | Facility: CLINIC | Age: 72
End: 2017-09-21

## 2017-09-21 VITALS
SYSTOLIC BLOOD PRESSURE: 108 MMHG | TEMPERATURE: 98 F | RESPIRATION RATE: 16 BRPM | HEIGHT: 67.99 IN | DIASTOLIC BLOOD PRESSURE: 70 MMHG | HEART RATE: 73 BPM

## 2017-09-21 DIAGNOSIS — I63.9 CEREBROVASCULAR ACCIDENT (CVA), UNSPECIFIED MECHANISM (HCC): Primary | ICD-10-CM

## 2017-09-21 DIAGNOSIS — R39.11 URINARY HESITANCY: ICD-10-CM

## 2017-09-21 DIAGNOSIS — K64.4 EXTERNAL HEMORRHOID, BLEEDING: ICD-10-CM

## 2017-09-21 DIAGNOSIS — K59.01 SLOW TRANSIT CONSTIPATION: ICD-10-CM

## 2017-09-21 PROCEDURE — G0463 HOSPITAL OUTPT CLINIC VISIT: HCPCS | Performed by: FAMILY MEDICINE

## 2017-09-21 PROCEDURE — 99214 OFFICE O/P EST MOD 30 MIN: CPT | Performed by: FAMILY MEDICINE

## 2017-09-21 NOTE — PATIENT INSTRUCTIONS
Continue with urology regarding urinary issues. Continue with anticoagulation with aspirin. Recommend continue with probiotics. Anusol HC prescribed.

## 2017-09-21 NOTE — PROGRESS NOTES
HPI:    Patient ID: Kg Whyte is a 81year old male. 81year old CM here for follow up regarding the effects of the stroke. Mental functions are not progressing well.  Can now ambulate with a four prong cane so long as he has support of a human on present. Left sided hemiparalysis              ASSESSMENT/PLAN:   1.  Cerebrovascular accident (CVA), unspecified mechanism ([facility] [address]  Essentially no change; continue PT/OT @ home that have been taught @ rehab.    2. Urinary hesitancy  Continue with urology

## 2017-09-26 ENCOUNTER — NURSE TRIAGE (OUTPATIENT)
Dept: OTHER | Age: 72
End: 2017-09-26

## 2017-09-26 NOTE — TELEPHONE ENCOUNTER
Action Requested: Summary for Provider     []  Critical Lab, Recommendations Needed  [x] Need Additional Advice  []   FYI    []   Need Orders  [] Need Medications Sent to Pharmacy  []  Other     SUMMARY: patient has had bleeding hemorrhoids for a few weeks

## 2017-09-26 NOTE — TELEPHONE ENCOUNTER
Returned wife's call and spoke with her. She was very confused about tests pt needs to get before his six month visit. Reviewed with her all the instructions on his avs, and then focused on the #7, as copied and pasted below as follows.  Wife finally verbal

## 2017-09-26 NOTE — TELEPHONE ENCOUNTER
Spoke with pt and verified . Dr Ayesha Griffiths recommendations given to pt and pt verb understanding. Pt states he has just starting using the anusol. He will continue to use it and a stool softener and call us back if no relief to discuss GI consult.

## 2017-09-26 NOTE — TELEPHONE ENCOUNTER
Make sure patient picked up [facility]. prescription. Stool softener may be of some help. GI consult may be ultimately needed. Decrease strain and commode sitting as much as possible.

## 2018-01-02 ENCOUNTER — TELEPHONE (OUTPATIENT)
Dept: SURGERY | Facility: CLINIC | Age: 73
End: 2018-01-02

## 2018-01-02 DIAGNOSIS — N20.0 KIDNEY STONES: Primary | ICD-10-CM

## 2018-01-02 NOTE — TELEPHONE ENCOUNTER
Patients spouse requesting a call back. Has questions regarding the orders needed for patients next follow up appt. States patient is handicapped and has trouble obtaining lab specimens. Please call. Thank you.

## 2018-01-02 NOTE — TELEPHONE ENCOUNTER
Phoned pt wife back and spoke with her. She states pt is handicapped and never is able to provide urine samples , as it is difficult for lab staff to provide the necessary care needed to assist him to urinate.  She states he is not incontinent, he can [place]

## 2018-01-03 NOTE — TELEPHONE ENCOUNTER
I spoke with pt's spouse and informed her of the orders in [facility]'s msg below and she verbalized understanding and ensures pt's compliance.  I also reminded her that [facility] had also ordered a KUB, CT stone protocol and a 24 hr urine in the past that pt has not ye spouse of the dates of these orders. I also told spouse that she will need to call to schd the CT scan and I gave the central schdg.  # and she then asked if pt could do that test there also and I told her that she would have to inquire with the  w

## 2018-01-03 NOTE — TELEPHONE ENCOUNTER
Please call patient back; important to try to submit urinalysis urine test and urine for cytology before visit with me; assist patient wife with ideas that would make this easier for them to do.   Many thanks, Dr. Sherman July

## 2018-01-16 ENCOUNTER — TELEPHONE (OUTPATIENT)
Dept: SURGERY | Facility: CLINIC | Age: 73
End: 2018-01-16

## 2018-01-16 NOTE — TELEPHONE ENCOUNTER
Patient has appt scheduled for a follow up on 01/26/18. Wife would like to speak with ELISE Loving. States patient has flu like symptoms and is to get lab work done prior to appt. But does not this he will be able to complete lab work due to falling ill.  Please

## 2018-01-16 NOTE — TELEPHONE ENCOUNTER
Phoned wife back and spoke with her. (see also prev note by nurse Owen Lai). She states pt is having flu like symptoms and she wants to know how soon tests can be done before visit, as pt is scheduled 1/26/18 .  Informed her if pt is having flu like symptoms, s

## 2018-02-22 ENCOUNTER — LAB ENCOUNTER (OUTPATIENT)
Dept: LAB | Age: 73
End: 2018-02-22
Attending: UROLOGY
Payer: MEDICARE

## 2018-02-22 ENCOUNTER — HOSPITAL ENCOUNTER (OUTPATIENT)
Dept: GENERAL RADIOLOGY | Age: 73
Discharge: HOME | End: 2018-02-22
Attending: UROLOGY
Payer: MEDICARE

## 2018-02-22 DIAGNOSIS — R31.29 MICROSCOPIC HEMATURIA: Primary | ICD-10-CM

## 2018-02-22 DIAGNOSIS — N20.0 KIDNEY STONE: ICD-10-CM

## 2018-02-22 PROCEDURE — 74019 RADEX ABDOMEN 2 VIEWS: CPT | Performed by: UROLOGY

## 2018-02-22 PROCEDURE — 88108 CYTOPATH CONCENTRATE TECH: CPT

## 2018-02-28 ENCOUNTER — OFFICE VISIT (OUTPATIENT)
Dept: SURGERY | Facility: CLINIC | Age: 73
End: 2018-02-28

## 2018-02-28 VITALS
WEIGHT: 168 LBS | HEART RATE: 70 BPM | DIASTOLIC BLOOD PRESSURE: 74 MMHG | BODY MASS INDEX: 25.46 KG/M2 | SYSTOLIC BLOOD PRESSURE: 112 MMHG | HEIGHT: 68 IN | RESPIRATION RATE: 16 BRPM

## 2018-02-28 DIAGNOSIS — N32.81 OVERACTIVE BLADDER: ICD-10-CM

## 2018-02-28 DIAGNOSIS — R35.1 NOCTURIA: ICD-10-CM

## 2018-02-28 DIAGNOSIS — R35.0 BENIGN PROSTATIC HYPERPLASIA WITH URINARY FREQUENCY: ICD-10-CM

## 2018-02-28 DIAGNOSIS — N31.9 NEUROGENIC BLADDER: ICD-10-CM

## 2018-02-28 DIAGNOSIS — N40.1 BENIGN PROSTATIC HYPERPLASIA WITH URINARY FREQUENCY: ICD-10-CM

## 2018-02-28 DIAGNOSIS — N20.0 KIDNEY STONE: Primary | ICD-10-CM

## 2018-02-28 PROCEDURE — 99214 OFFICE O/P EST MOD 30 MIN: CPT | Performed by: UROLOGY

## 2018-02-28 PROCEDURE — G0463 HOSPITAL OUTPT CLINIC VISIT: HCPCS | Performed by: UROLOGY

## 2018-02-28 NOTE — PROGRESS NOTES
HPI:    Patient ID: Mahendra Masterson is a 81year old male. HPI       History provided by patient and wife, Danika Adams; patient debilitated in a wheel chair. 1. Kidney stone   Current punctate kidney stones on KUB. No concerning sxs at present.  No dysuria on the toilet    CHART REVIEW    [facility]  consult  [facility] 4/14/2016 for 2 distal left ureteral calculi 5 mm and 2.5 mm causing left hydroureteronephrosis and renal colic; CT showed additional 3 mm stone left kidney. Moderate to large BPH 2-3+ enlarged.  Faile irritable bowel disease      Past Surgical History:  No date: APPENDECTOMY  2011: CATARACT Bilateral      Comment: \"Cataract extraction, cataracts\" per NG                [=BILATERAL?]  No date: LIG DIV&STRIPPING SHORT SAPHENOUS VEIN      Comment: \"veins high-grade urothelial carcinoma  07/21/2017 UA RBC = 5; WBC = 2; Urine Culture = 10,000 - 50,000 CFU/ML Staphylococcus species, not aureus--observed   11/01/2016 PSA = 1.2; UA Microscopic = not indicated; Blood = Negative  Surgical Pathology Tissue = Artif his stones with KUB in one year and pt and wife understand and agree.     (N40.1,  R35.0) Benign prostatic hyperplasia with urinary frequency  On ZELDA, prostate 2-3+ enlarged, no palpable nodules or indurations.  The patient previously on Finasteride but sto testosterone test but patient and wife decide against it; they feel it is due to stress form CVA. I fully explained benefits, risks, and alternatives of treatment options listed above. Treatment Plan & Patient Instructions    1.    To lower your ch ([id number])       [id number]  By signing my name below, Lara Kirby,  attest that this documentation has been prepared under the direction and in the presence of Mamie Preston MD.   Electronically Signed: Deborah Landrum, 2/28/2018, 9:16 AM.

## 2018-02-28 NOTE — PATIENT INSTRUCTIONS
1.   To lower your chances of developing kidney stone or having  existing stones increase in size, please avoid salty foods and also sodium and salt in your diet; limit foods high in oxalate such as nuts, peanut butter, tea, ice tea, green tea, cocoa, spin

## 2018-03-05 ENCOUNTER — TELEPHONE (OUTPATIENT)
Dept: OTHER | Age: 73
End: 2018-03-05

## 2018-03-05 DIAGNOSIS — R26.2 IMPAIRED AMBULATION: Primary | ICD-10-CM

## 2018-03-05 NOTE — TELEPHONE ENCOUNTER
Pt's son, Mathew Schneider, is asking for doctor's recommendations for a stroke rehab facility in the [place] area, pt had a stroke last year. No TOMMY on file. Son states pt can be contacted at home number on file.

## 2018-03-06 ENCOUNTER — NURSE TRIAGE (OUTPATIENT)
Dept: OTHER | Age: 73
End: 2018-03-06

## 2018-03-06 NOTE — TELEPHONE ENCOUNTER
Patient states he went to the [facility] facility in [facility]   Per caregiver, patient started therapy in the [facility] inpatient facility in Feb 2017, after he did 2-3 months of home therapy, then advanced to outpatient therapy at the [facility] facility in [facility]

## 2018-03-06 NOTE — TELEPHONE ENCOUNTER
Please find out if the patient did the rehab in the [facility] facility in the [facility].

## 2018-03-06 NOTE — TELEPHONE ENCOUNTER
Action Requested: Summary for Provider     []  Critical Lab, Recommendations Needed  [x] Need Additional Advice  []   FYI    []   Need Orders  [] Need Medications Sent to Pharmacy  []  Other     SUMMARY: Contacted patient about rehabilitation history for s Abdominal pain is a chronic symptom (recurrent or ongoing AND lasting > 4 weeks)    Protocols used: ABDOMINAL PAIN - MALE-A-OH

## 2018-03-07 NOTE — TELEPHONE ENCOUNTER
LMB for contact information for [address] [place] [address] rehab center. Referral placed in chart (under other orders)    Please transfer to RN Triage. Thank you.

## 2018-03-07 NOTE — TELEPHONE ENCOUNTER
New referral needs to be generated so he can return for more services @ [facility]. Thanks.

## 2018-03-08 NOTE — TELEPHONE ENCOUNTER
Patient aware that Referral for [address] [place] [address] rehab center completed. Patient requesting referral be mailed to home. Address verified.  Referral mailed per patient request.

## 2018-03-23 ENCOUNTER — OFFICE VISIT (OUTPATIENT)
Dept: FAMILY MEDICINE CLINIC | Facility: CLINIC | Age: 73
End: 2018-03-23

## 2018-03-23 VITALS
HEIGHT: 68 IN | HEART RATE: 75 BPM | SYSTOLIC BLOOD PRESSURE: 116 MMHG | DIASTOLIC BLOOD PRESSURE: 73 MMHG | RESPIRATION RATE: 16 BRPM

## 2018-03-23 DIAGNOSIS — M25.642 STIFFNESS OF LEFT HAND JOINT: ICD-10-CM

## 2018-03-23 DIAGNOSIS — H91.92 HEARING LOSS OF LEFT EAR, UNSPECIFIED HEARING LOSS TYPE: ICD-10-CM

## 2018-03-23 DIAGNOSIS — K59.00 CONSTIPATION, UNSPECIFIED CONSTIPATION TYPE: ICD-10-CM

## 2018-03-23 DIAGNOSIS — I63.9 CEREBROVASCULAR ACCIDENT (CVA), UNSPECIFIED MECHANISM (HCC): Primary | ICD-10-CM

## 2018-03-23 DIAGNOSIS — H93.11 TINNITUS OF RIGHT EAR: ICD-10-CM

## 2018-03-23 DIAGNOSIS — R10.33 PERIUMBILICAL PAIN: ICD-10-CM

## 2018-03-23 PROCEDURE — 99214 OFFICE O/P EST MOD 30 MIN: CPT | Performed by: FAMILY MEDICINE

## 2018-03-23 PROCEDURE — G0463 HOSPITAL OUTPT CLINIC VISIT: HCPCS | Performed by: FAMILY MEDICINE

## 2018-03-23 NOTE — PROGRESS NOTES
HPI:    Patient ID: Suze Sevilla is a 81year old male. Abdominal Pain   This is a recurrent problem. The current episode started more than 1 month ago. The problem occurs intermittently. The problem has been unchanged.  The pain is located in the pe tamsulosin HCl 0.4 MG Oral Cap Take by mouth daily. Disp:  Rfl:    finasteride 5 MG Oral Tab Take 1 tablet (5 mg total) by mouth daily. Disp: 90 tablet Rfl: 3   aspirin 81 MG Oral Chew Tab Chew 1 tablet (81 mg total) by mouth daily.  Disp: 100 tablet Rfl: pylori. Return in about 3 months (around 6/23/2018), or if symptoms worsen or fail to improve.          [id number]

## 2018-03-23 NOTE — PATIENT INSTRUCTIONS
Medication reviewed and renewed where needed and appropriate. Comply with medications. Monitor blood pressures and record at home. Limit salt intake. Recommend daily exercising and consistent healthy dietary changes. More PT is needed.   Continue with n

## 2018-03-24 ENCOUNTER — NURSE TRIAGE (OUTPATIENT)
Dept: OTHER | Age: 73
End: 2018-03-24

## 2018-03-24 NOTE — TELEPHONE ENCOUNTER
Action Requested: Summary for Provider     []  Critical Lab, Recommendations Needed  [] Need Additional Advice  [x]   FYI    []   Need Orders  [] Need Medications Sent to Pharmacy  []  Other     SUMMARY: Advised the pt and spouse that pt should go to ER.

## 2018-03-26 ENCOUNTER — TELEPHONE (OUTPATIENT)
Dept: SURGERY | Facility: CLINIC | Age: 73
End: 2018-03-26

## 2018-03-26 DIAGNOSIS — N20.0 KIDNEY STONE: Primary | ICD-10-CM

## 2018-03-26 DIAGNOSIS — N20.1 URETERAL STONE: ICD-10-CM

## 2018-03-26 NOTE — TELEPHONE ENCOUNTER
pt spouse is calling and wants to talk to you. She can be reached at [phone] She wants pt to be transferred to [address] [place] [address]. She stated that she will drive him to [address] [place] [address].  Please Advise

## 2018-03-26 NOTE — TELEPHONE ENCOUNTER
Called patient - verified patient's name and  - pt states he is currently admitted to [facility] for kidney stones and UTI.  Advised pt to call to schedule a f/u visit once he is discharged, Patient verbalized understanding and had no further question

## 2018-03-26 NOTE — TELEPHONE ENCOUNTER
pts wife Shonda Dears called. She is requesting for Help. She wants to get her  out of [facility], [facility] in [facility]. He was admitted for large kidney stones. pt is disabled and a stroke patient.   She would like him transferred to [facility]

## 2018-03-27 ENCOUNTER — TELEPHONE (OUTPATIENT)
Dept: FAMILY MEDICINE CLINIC | Facility: CLINIC | Age: 73
End: 2018-03-27

## 2018-03-27 NOTE — TELEPHONE ENCOUNTER
Patients wife called again requesting to speak with clinical staff. Encounter was under patient admin changed status to acute. Patient is currently at [facility]. Wants patient to be transferred to [address] [place] [address]. Would like PVK to take care of patient.  A

## 2018-03-27 NOTE — TELEPHONE ENCOUNTER
I spoke with pt's spouse and determined that pt is still an inpatient at [facility] and receiving pain control meds for a 4 mm kidney stone.  States that pt is not in pain at this time but she would like pt to have surgery to remove the stone ASAP because he has

## 2018-03-27 NOTE — TELEPHONE ENCOUNTER
She cannot drive her  to another hospital without a formal discharge which I cannot authorize unless she signs out AMA. If she signs out AMA her admission to [facility] cannot be treated as a transfer.  If she does any of the above she will be assuming

## 2018-03-27 NOTE — TELEPHONE ENCOUNTER
Pt's wife calling, states pt is currently admitted to [facility] for kidney stone, pt being discharged today.  Wife is concerned because the [facility] urologist does not want to go in to remove the 4mm stone right now, he wants to wait a week for pt to pass the sto

## 2018-03-27 NOTE — TELEPHONE ENCOUNTER
Please assist this patient in getting this symptomatic acute patient into our system for this week. If this is not possible then the patient should be advised to comply with the referral given from [facility].  His acute problem should not be a means to potential

## 2018-03-27 NOTE — TELEPHONE ENCOUNTER
Patients wife called again. Informed her we would only be able to offer patient next available with PVK which would be in May. disappointed that clinical staff would not be able to have patient be seen sooner than next available.  States patient is on pain

## 2018-03-27 NOTE — TELEPHONE ENCOUNTER
Phoned wife back and received voice mail. [email or web address], wife has to communicate her wish to the doctors caring for him at [facility]. They would arrange for the transfer on that end.

## 2018-03-27 NOTE — TELEPHONE ENCOUNTER
Urology RN sent message to Dr Maylin Mcghee for sooner appointment. Will monitor when appointment is made and will contact urology office if sooner appointment not given.

## 2018-03-27 NOTE — TELEPHONE ENCOUNTER
Reviewed doctor's message with pt's spouse. Pt states she is going to the talk to the urologist tomorrow about discharging pt from [facility] so pt can be admitted to [facility] for follow up with Dr. Merry Gauthier.

## 2018-04-01 NOTE — TELEPHONE ENCOUNTER
Please call patient back; needs to get KUB, at minimum, and IF patient has a little pain or no pain and if he is to avoid surgery, then kidney ultrasound as well --KUB must be completed (and possibly kidney ultrasound) BEFORE he sees me; also BEFORE KUB, B

## 2018-04-02 NOTE — TELEPHONE ENCOUNTER
Spoke with pt's spouse Yeimy Lara and informed her of all of PVK's instructions and orders in his msg below and I also gave the central schdg # to call for the renal US.  I offered an appt for tomorrow 4/3 at 4 pm and suggested that pt see if he can prep for t

## 2018-04-06 NOTE — TELEPHONE ENCOUNTER
Spoke to wife to follow-up on patient's condition. Wife stated patient seen Dr Bennie White ( urologist) at [facility] on Wednesday 4/4/18. States x-ray was ordered to recheck if kidney stone was still present. Per wife, patient has decreased pain.    Wif

## 2018-04-09 NOTE — TELEPHONE ENCOUNTER
Pt's spouse called back who has a signed TOMMY on file and she informed that pt does not feel that he needs an appt with PVK at this time and he did not complete the KUB or the renal US because he passed the kidney stone and is no longer in pain.  Tasked to [place]

## 2018-04-09 NOTE — TELEPHONE ENCOUNTER
Attempted to reach pt at the [facility] # and LMTCB that I can offer an o/v appt for Fri. 4/13 at 1 pm but I also see that pt has not completed the KUB and the renal US that PVK gave orders for.

## 2018-04-10 ENCOUNTER — TELEPHONE (OUTPATIENT)
Dept: OTHER | Age: 73
End: 2018-04-10

## 2018-04-10 NOTE — TELEPHONE ENCOUNTER
Patient needs a parking placard form filled out. Please complete and call spouse when completed.    Please respond to pool: EM FM OPO LPN/MARIBELL

## 2018-04-12 NOTE — TELEPHONE ENCOUNTER
Contacted wife she stated that she completed this form online. Wife informed that this office does not have access to the 's processing of forms done online. She needs to bring form or mail form to the office for completion by Queen Poornima.  She st

## 2018-06-07 ENCOUNTER — NURSE TRIAGE (OUTPATIENT)
Dept: FAMILY MEDICINE CLINIC | Facility: CLINIC | Age: 73
End: 2018-06-07

## 2018-06-07 NOTE — TELEPHONE ENCOUNTER
Pt's spouse calling to notify Dr Taylor Greene the pt fell and she assumes fractured his hip. Spouse states she will be calling 911 for pt to be taken to [facility], inquiring if there's anything she should do.  Advised spouse that when pt is discharged from

## 2018-08-16 ENCOUNTER — TELEPHONE (OUTPATIENT)
Dept: FAMILY MEDICINE CLINIC | Facility: CLINIC | Age: 73
End: 2018-08-16

## 2018-08-16 NOTE — TELEPHONE ENCOUNTER
Pt spouse dropped of [employer] Form to be completed and spouse will pk up once completed.  Form placed in [employer]

## 2020-11-23 NOTE — TELEPHONE ENCOUNTER
Doctor, please see [facility] message below.
HH is calling state that she discharge pt from PT today
Noted
forehead

## 2022-03-14 NOTE — TELEPHONE ENCOUNTER
Advised patient that checked chart and was not able to find the name of the opthalmologist that patient had seen . Patient advised and patient cannot remembered when,where and how long ago was the surgery. stated

## 2022-06-27 NOTE — TELEPHONE ENCOUNTER
Doctor, please see message below. Encounter also sent to . Complex Repair And Tissue Cultured Epidermal Autograft Text: The defect edges were debeveled with a #15 scalpel blade.  The primary defect was closed partially with a complex linear closure.  Given the location of the defect, shape of the defect and the proximity to free margins an tissue cultured epidermal autograft was deemed most appropriate to repair the remaining defect.  The graft was trimmed to fit the size of the remaining defect.  The graft was then placed in the primary defect, oriented appropriately, and sutured into place.

## (undated) NOTE — MR AVS SNAPSHOT
[facility]  [address], [address]  [phone]               Thank you for choosing us for your health care visit with Tunde Lawler DO.   We are glad to serve you and happy to provide you with this sum 113/74 mmHg 80 97.8 °F (36.6 °C) (Oral) 5' 7.99\" (1.727 m)           Current Medications          This list is accurate as of: 4/17/17  8:11 PM.  Always use your most recent med list.                aspirin 81 MG Chew   Chew 1 tablet (81 mg total) by romel not sign up before the expiration date, you must request a new code. Your unique [employer] Access Code: [email or web address]  Expires: 6/16/2017  8:11 PM    If you have questions, you can call [phone] to talk to our [facility] Staff.  Remember, [employer]

## (undated) NOTE — MR AVS SNAPSHOT
[facility]  [address], [address]  [phone]               Thank you for choosing us for your health care visit with Army Deysi DO.   We are glad to serve you and happy to provide you with this sum acquired. Please contact the Patient Business Office at [phone] if you have any questions related to insurance coverage. Thank you.          Referral Details     Referred By    Referred To    DO Ju Evangelista [facility] No Known Allergies                Today's Vital Signs     BP Pulse Temp Height Weight BMI    105/70 mmHg 81 97.8 °F (36.6 °C) (Oral) 5' 7.99\" (1.727 m) 168 lb 12.8 oz (76.567 kg) 25.67 kg/m2         Current Medications          This list is accurate as o INSIDE YOUR HOME   KITCHEN:  ? Use non skid mats only. ? Clean up spills as soon as they happen. ? Keep objects that you use often within easy reach. BATHROOM:  ? Install grab bars on the bathroom walls beside the tub, shower and toilet. ?  Use a non

## (undated) NOTE — MR AVS SNAPSHOT
[place]  [address]  [phone]               Thank you for choosing us for your health care visit with Kristi Hummel MD.  We are glad to serve you and happy to provide you with this summa AROM, AAROM, PROM in elevation, external rotation, internal rotation and cross-body adduction with the elbow straight, avoid impingement position    No pulleys    Advance to sleeper stretches    Modalities prn: ice/heat, ultrasound, massage and myofascial PAMELA D[facility]ERY [facility]  [facility], [address] [facility], [address] [place] [address], [address] [place] [address]  [phone] [address] [facility] be covered unless prior authorization is obtained in accordance with your insurance company's guidelines. Unauthorized care may be your financial responsibility. If you have questions, please call your plans customer service number located on your ID card. facilities, please call [phone]. Center for MAUDE TRAN [facility]  [address]  [address] [facility], [address]    [address] [address] Commonly known as:  FLONASE           gabapentin 100 MG Caps   Take 1 capsule (100 mg total) by mouth nightly. Commonly known as:  NEURONTIN           levetiracetam 750 MG Tabs   Take 1 tablet (750 mg total) by mouth 2 (two) times daily.    Commonly known Fully enjoy your food when eating. Don’t eat while distracted and slow down. Avoid over sized portions. Don’t eat while when you’re bored.      EAT THESE FOODS MORE OFTEN: EAT THESE FOODS LESS OFTEN:   Make half your plate fruits and vegetables Highly